# Patient Record
Sex: FEMALE | Race: WHITE | NOT HISPANIC OR LATINO | Employment: FULL TIME | URBAN - METROPOLITAN AREA
[De-identification: names, ages, dates, MRNs, and addresses within clinical notes are randomized per-mention and may not be internally consistent; named-entity substitution may affect disease eponyms.]

---

## 2017-05-01 ENCOUNTER — OFFICE VISIT (OUTPATIENT)
Dept: FAMILY MEDICINE | Facility: CLINIC | Age: 22
End: 2017-05-01
Payer: COMMERCIAL

## 2017-05-01 VITALS — DIASTOLIC BLOOD PRESSURE: 60 MMHG | TEMPERATURE: 98.6 F | SYSTOLIC BLOOD PRESSURE: 100 MMHG

## 2017-05-01 DIAGNOSIS — Z71.84 TRAVEL ADVICE ENCOUNTER: Primary | ICD-10-CM

## 2017-05-01 DIAGNOSIS — Z23 NEED FOR VACCINATION: ICD-10-CM

## 2017-05-01 PROCEDURE — 90472 IMMUNIZATION ADMIN EACH ADD: CPT | Mod: GA | Performed by: NURSE PRACTITIONER

## 2017-05-01 PROCEDURE — 90632 HEPA VACCINE ADULT IM: CPT | Mod: GA | Performed by: NURSE PRACTITIONER

## 2017-05-01 PROCEDURE — 99402 PREV MED CNSL INDIV APPRX 30: CPT | Mod: 25 | Performed by: NURSE PRACTITIONER

## 2017-05-01 PROCEDURE — 90715 TDAP VACCINE 7 YRS/> IM: CPT | Mod: GA | Performed by: NURSE PRACTITIONER

## 2017-05-01 PROCEDURE — 90691 TYPHOID VACCINE IM: CPT | Mod: GA | Performed by: NURSE PRACTITIONER

## 2017-05-01 PROCEDURE — 90471 IMMUNIZATION ADMIN: CPT | Mod: GA | Performed by: NURSE PRACTITIONER

## 2017-05-01 PROCEDURE — 90686 IIV4 VACC NO PRSV 0.5 ML IM: CPT | Mod: GA | Performed by: NURSE PRACTITIONER

## 2017-05-01 RX ORDER — AZITHROMYCIN 500 MG/1
500 TABLET, FILM COATED ORAL DAILY
Qty: 3 TABLET | Refills: 0 | Status: SHIPPED | OUTPATIENT
Start: 2017-05-01 | End: 2017-05-04

## 2017-05-01 RX ORDER — LEVONORGESTREL AND ETHINYL ESTRADIOL 0.15-0.03
1 KIT ORAL DAILY
Qty: 28 TABLET | Refills: 0 | COMMUNITY
Start: 2017-05-01

## 2017-05-01 NOTE — PATIENT INSTRUCTIONS
Today May 1, 2017 you received the    Flu Vaccine    Hepatitis A Vaccine - Please return on 10/28/17 or later for your 2nd and final dose.    Tetanus (Tdap) Vaccine    Typhoid - injectable. This vaccine is valid for two years.   .    These appointments can be made as a NURSE ONLY visit.    **It is very important for the vaccinations to be given on the scheduled day(s), this helps ensure you receive the full effectiveness of the vaccine.**    Please call [facility] with any questions [phone]    Thank you for visiting [facility]

## 2017-05-01 NOTE — PROGRESS NOTES
Nurse Note      Itinerary:  [facility]      Departure Date: 5/14/17      Return Date: 6/4/17      Length of Trip 3 weeks      Reason for Travel: Study abroad           Urban or rural: both      Accommodations: Hotel    Hostel        IMMUNIZATION HISTORY  Have you received any immunizations within the past 4 weeks?  No  Have you ever fainted from having your blood drawn or from an injection?  No  Have you ever had a fever reaction to vaccination?  No  Have you ever had any bad reaction or side effect from any vaccination?  No  Have you ever had hepatitis A or B vaccine?  Yes  Do you live (or work closely) with anyone who has AIDS, an AIDS-like condition, any other immune disorder or who is on chemotherapy for cancer?  No  Do you have a family history of immunodeficiency?  No  Have you received any injection of immune globulin or any blood products during the past 12 months?  No    Patient roomed by Paul Resendez  Tricia Saldivar is a 22 year old female seen today alone for counsultation for international travel to [facility] for Study abroad.  Patient will be departing in  2 day(s) and staying for   3 week(s) and  traveling with [facility].      Patient itinerary :  will be in the urban region [facility] which presents risk for Dengue Fever, Chikungungya, Rabies, food borne illnesses, motor vehicle accidents and Typhoid. exposure.      Patient's activities will include sightseeing, visiting hospital/orphanages and study abroad.  [facility] [employer]     Patient's country of birth is [place]    Special medical concerns: none  Pre-travel questionnaire was completed by patient and reviewed by provider.     Vitals: /60  Temp 98.6  F (37  C) (Oral)  BMI= There is no height or weight on file to calculate BMI.    EXAM:  General:  Well-nourished, well-developed in no acute distress.  Appears to be stated age, interacts appropriately and expresses understanding of information given to  patient.    Current Outpatient Prescriptions   Medication Sig Dispense Refill     levonorgestrel-ethinyl estradiol (NORDETTE) 0.15-30 MG-MCG per tablet Take 1 tablet by mouth daily 28 tablet 0     There is no problem list on file for this patient.    No Known Allergies      Immunizations discussed include:   Hepatitis A:  Ordered/given today, risks, benefits and side effects reviewed  Hepatitis B: Up to date  Influenza: Ordered/given today, risks, benefits and side effects reviewed  Typhoid: Ordered/given today, risks, benefits and side effects reviewed  Rabies: Declined  reviewed managment of a animal bite or scratch (washing wound, seek medical care within 24 hours for post exposure prophylaxis )  Yellow Fever: Not indicated  Japanese Encephalitis: Not indicated  Meningococcus: Up to date  Tetanus/Diphtheria: booster today  Measles/Mumps/Rubella: Up to date  Cholera: Not needed  Polio: Up to date  Pneumococcal: Under age of 65  Varicella: Up to date (had disease)   Zostavax:  Not indicated  HPV:  Up to date  TB:  Low risk     Altitude Exposure on this trip: no ([place]     ASSESSMENT/PLAN:    ICD-10-CM    1. Travel advice encounter Z71.89 HEPA VACCINE ADULT IM     TYPHOID VACCINE, IM     HC FLU VAC PRESRV FREE QUAD SPLIT VIR 3+YRS IM     TDAP VACCINE (ADACEL)     azithromycin (ZITHROMAX) 500 MG tablet   2. Need for vaccination Z23 HEPA VACCINE ADULT IM     TYPHOID VACCINE, IM     HC FLU VAC PRESRV FREE QUAD SPLIT VIR 3+YRS IM     TDAP VACCINE (ADACEL)     I have reviewed general recommendations for safe travel   including: food/water precautions, insect precautions, safer sex   practices given high prevalence of Zika, HIV and other STDs,   roadway safety. Educational materials and [employer] report provided.    Malaraia prophylaxis recommended: none  Symptomatic treatment for traveler's diarrhea: azithromycin  Altitude illness prevention and treatment: no      Evacuation insurance advised and resources were  provided to patient.    Total visit time 30 minutes  with over 50% of time spent counseling patient as detailed above.    Lauren Macedo CNP

## 2017-05-01 NOTE — NURSING NOTE
Chief Complaint   Patient presents with     Travel Clinic     initial /60  Temp 98.6  F (37  C) (Oral) There is no height or weight on file to calculate BMI.  BP completed using cuff size: regular.  L  arm      Health Maintenance that is potentially due pending provider review:  NONE    n/a    Paul Ruffin ma

## 2017-05-01 NOTE — MR AVS SNAPSHOT
After Visit Summary   5/1/2017    Tricia Saldivar    MRN: [id number]           Patient Information     Date Of Birth          1995        Visit Information        Provider Department      5/1/2017 9:15 AM aLuren Macedo APRN CNP [facility]        Todays Diagnoses     Travel advice encounter    -  1    Need for vaccination          Care Instructions    Today May 1, 2017 you received the    Flu Vaccine    Hepatitis A Vaccine - Please return on 10/28/17 or later for your 2nd and final dose.    Tetanus (Tdap) Vaccine    Typhoid - injectable. This vaccine is valid for two years.   .    These appointments can be made as a NURSE ONLY visit.    **It is very important for the vaccinations to be given on the scheduled day(s), this helps ensure you receive the full effectiveness of the vaccine.**    Please call [facility] with any questions [phone]    Thank you for visiting [facility]            Follow-ups after your visit        Who to contact     If you have questions or need follow up information about [facility]'s clinic visit or your schedule please contact [facility] directly at [phone].  Normal or non-critical lab and imaging results will be communicated to you by MyChart, letter or phone within 4 business days after the clinic has received the results. If you do not hear from us within 7 days, please contact the clinic through [employer] or phone. If you have a critical or abnormal lab result, we will notify you by phone as soon as possible.  Submit refill requests through "[employer] or call your pharmacy and they will forward the refill request to us. Please allow 3 business days for your refill to be completed.          Additional Information About Your Visit        MyChart Information     "[employer] lets you send messages to your doctor, view your test results, renew your prescriptions, schedule appointments and more. To sign up, go to  "[email or web address] . Click on \"Log in\" on the left side of the screen, which will take you to the Welcome page. Then click on \"Sign up Now\" on the right side of the page.     You will be asked to enter the access code listed below, as well as some personal information. Please follow the directions to create your username and password.     Your access code is: [email or web address]  Expires: 2017  9:42 AM     Your access code will  in 90 days. If you need help or a new code, please call your [place] clinic or [phone].        Care EveryWhere ID     This is your Care EveryWhere ID. This could be used by other organizations to access your [place] medical records  [id number]        Your Vitals Were     Temperature                   98.6  F (37  C) (Oral)            Blood Pressure from Last 3 Encounters:   17 100/60    Weight from Last 3 Encounters:   No data found for Wt              We Performed the Following     HC FLU VAC PRESRV FREE QUAD SPLIT VIR 3+YRS IM     HEPA VACCINE ADULT IM     TDAP VACCINE (ADACEL)     TYPHOID VACCINE, IM          Today's Medication Changes          These changes are accurate as of: 17  9:42 AM.  If you have any questions, ask your nurse or doctor.               Start taking these medicines.        Dose/Directions    azithromycin 500 MG tablet   Commonly known as:  ZITHROMAX   Used for:  Travel advice encounter   Started by:  Lauren Macedo APRN CNP        Dose:  500 mg   Take 1 tablet (500 mg) by mouth daily for 3 doses Take 1 tablet a day for up to 3 days for severe diarrhea   Quantity:  3 tablet   Refills:  0            Where to get your medicines      These medications were sent to [place] [id number] [place] [facility] [address] [facility]  [address] [facility] [address]     Phone:  [phone]     azithromycin 500 MG tablet                Primary Care Provider    None Specified       No primary provider on file.        Thank you!     Thank you for " choosing [facility]  for your care. Our goal is always to provide you with excellent care. Hearing back from our patients is one way we can continue to improve our services. Please take a few minutes to complete the written survey that you may receive in the mail after your visit with us. Thank you!             Your Updated Medication List - Protect others around you: Learn how to safely use, store and throw away your medicines at www.disposemymeds.org.          This list is accurate as of: 5/1/17  9:42 AM.  Always use your most recent med list.                   Brand Name Dispense Instructions for use    azithromycin 500 MG tablet    ZITHROMAX    3 tablet    Take 1 tablet (500 mg) by mouth daily for 3 doses Take 1 tablet a day for up to 3 days for severe diarrhea       levonorgestrel-ethinyl estradiol 0.15-30 MG-MCG per tablet    NORDETTE    28 tablet    Take 1 tablet by mouth daily

## 2024-06-05 ENCOUNTER — OFFICE VISIT (OUTPATIENT)
Dept: URGENT CARE | Facility: URGENT CARE | Age: 29
End: 2024-06-05
Payer: COMMERCIAL

## 2024-06-05 VITALS
TEMPERATURE: 97.9 F | SYSTOLIC BLOOD PRESSURE: 110 MMHG | HEART RATE: 83 BPM | WEIGHT: 133 LBS | DIASTOLIC BLOOD PRESSURE: 72 MMHG | OXYGEN SATURATION: 97 %

## 2024-06-05 DIAGNOSIS — M25.562 ARTHRALGIA OF BOTH KNEES: ICD-10-CM
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
## 2024-06-05 DIAGNOSIS — M25.561 ARTHRALGIA OF BOTH KNEES: ICD-10-CM

## 2024-06-05 DIAGNOSIS — S80.862A TICK BITE OF LEFT LOWER LEG, INITIAL ENCOUNTER: Primary | ICD-10-CM

## 2024-06-05 DIAGNOSIS — M79.10 MYALGIA: ICD-10-CM

## 2024-06-05 DIAGNOSIS — R50.9 FEVER IN ADULT: ICD-10-CM

## 2024-06-05 PROCEDURE — 99203 OFFICE O/P NEW LOW 30 MIN: CPT | Performed by: NURSE PRACTITIONER

## 2024-06-05 RX ORDER — DOXYCYCLINE 100 MG/1
100 CAPSULE ORAL 2 TIMES DAILY
Qty: 20 CAPSULE | Refills: 0 | Status: SHIPPED | OUTPATIENT
Start: 2024-06-05 | End: 2024-06-15

## 2024-06-05 ASSESSMENT — PAIN SCALES - GENERAL: PAINLEVEL: MILD PAIN (3)

## 2024-06-05 NOTE — PROGRESS NOTES
Chief Complaint   Patient presents with    Urgent Care     Pt went camping on 05/10  Symptoms started on 06/1    Tick Bite    Headache    Fever    Fatigue     SUBJECTIVE:  Tricia Saldivar is a 29 year old female presenting with concern for Lyme.  She went camping early May and found multiple wood and deer ticks, most concerning to the LLE.  In the past week she has noted headaches myalgias arthralgias fever fatigue.  No obvious URI cough cold UTI or other source of infection.    No past medical history on file.  Current Outpatient Medications   Medication Sig Dispense Refill    levonorgestrel-ethinyl estradiol (NORDETTE) 0.15-30 MG-MCG per tablet Take 1 tablet by mouth daily 28 tablet 0     No current facility-administered medications for this visit.     Social History     Tobacco Use    Smoking status: Never    Smokeless tobacco: Never   Substance Use Topics    Alcohol use: Not on file     No Known Allergies    Review of Systems  All systems negative except for those listed above in HPI.    OBJECTIVE:   /72   Pulse 83   Temp 97.9  F (36.6  C) (Temporal)   Wt 60.3 kg (133 lb)   SpO2 97%     Physical Exam  Vitals reviewed.   Constitutional:       General: She is not in acute distress.     Appearance: Normal appearance. She is not ill-appearing, toxic-appearing or diaphoretic.   HENT:      Head: Normocephalic and atraumatic.      Right Ear: Tympanic membrane and ear canal normal.      Left Ear: Tympanic membrane and ear canal normal.      Nose: Nose normal.      Mouth/Throat:      Mouth: Mucous membranes are moist.      Pharynx: Oropharynx is clear. No oropharyngeal exudate or posterior oropharyngeal erythema.   Cardiovascular:      Rate and Rhythm: Normal rate.      Pulses: Normal pulses.   Pulmonary:      Effort: Pulmonary effort is normal. No respiratory distress.      Breath sounds: No stridor. No wheezing.   Musculoskeletal:         General: Normal range of motion.      Cervical back: Normal range of  "motion.   Lymphadenopathy:      Cervical: No cervical adenopathy.   Skin:     General: Skin is warm and dry.      Findings: No rash.   Neurological:      General: No focal deficit present.      Mental Status: She is alert and oriented to person, place, and time.      Sensory: No sensory deficit.      Motor: No weakness.      Gait: Gait normal.   Psychiatric:         Mood and Affect: Mood normal.         Behavior: Behavior normal.       ASSESSMENT:    ICD-10-CM    1. Tick bite of left lower leg, initial encounter  S80.862A doxycycline hyclate (VIBRAMYCIN) 100 MG capsule    W57.XXXA       2. Fever in adult  R50.9 doxycycline hyclate (VIBRAMYCIN) 100 MG capsule      3. Arthralgia of both knees  M25.561     M25.562       4. Myalgia  M79.10 doxycycline hyclate (VIBRAMYCIN) 100 MG capsule          PLAN:     Shared decision making to treat empirically for Lyme  No other source of infection  Serology testing may not show for 6 to 8 weeks  Patient is not breast-feeding or pregnant  Understands to take medicine with food but not dairy for an hour of the pills  Reevaluation if new or worsening symptoms    A tick must be attached for at least 24-48 hours to transfer pathogens and cause an infection. Infection is very unlikely in the first 48-72 hours.  Infection is more likely if the tick is engorged or has been attached for over 72 hours.  Prophylactic antibiotics are recommended only if:  The tick was attached for 36 hours or more AND less than 72 hours has passed since the tick was removed  Treatment with a single dose of Doxycycline, 200mg (4mg/kg in children 8 years of age or older) by mouth with food.  -Do not give to children under 8 or pregnant or lactating women  If there is a contraindication to doxycycline, an alternate antibiotic is not recommended.  Guidelines state, \"If sections of the mouthparts of the tick remain in the skin, they should be left alone as they will normally be expelled spontaneously.\"  Ticks " should be removed with tweezers or protected fingers pulling straight out gently and firmly using steady pressure.   Apply ice packs, may take benadryl as needed itch/irritation.  Ibuprofen for discomfort.   Watch for signs of secondary infection- redness, swelling, pain, colored discharge or fever.  Advised to watch for erythema migrans rash (circular red ringed rash), fever, chills, sweats, body aches, stiff neck, headache, joint pain/ discomfort/ sweling or other flu/illness symptoms and be seen by primary care provider at that time.   Erythema migrans rash appears several days after tick bite and is separate from a local reaction to a tick bite.  Please follow up with primary care provider if not improving, worsening or new symptoms.  It will take 6-8 weeks before antibodies are detected with the lyme screen titer.      Follow up with primary care provider with any problems, questions or concerns or if symptoms worsen or fail to improve. Patient agreed to plan and verbalized understanding.    Susan Pierre, TANYA-BC  [facility]

## 2024-10-08 ENCOUNTER — OFFICE VISIT (OUTPATIENT)
Dept: URGENT CARE | Facility: URGENT CARE | Age: 29
End: 2024-10-08

## 2024-10-08 VITALS
OXYGEN SATURATION: 100 % | DIASTOLIC BLOOD PRESSURE: 82 MMHG | RESPIRATION RATE: 14 BRPM | TEMPERATURE: 99 F | SYSTOLIC BLOOD PRESSURE: 123 MMHG | HEART RATE: 78 BPM

## 2024-10-08 DIAGNOSIS — B02.9 HERPES ZOSTER WITHOUT COMPLICATION: Primary | ICD-10-CM

## 2024-10-08 PROCEDURE — 99213 OFFICE O/P EST LOW 20 MIN: CPT | Performed by: NURSE PRACTITIONER

## 2024-10-08 RX ORDER — VALACYCLOVIR HYDROCHLORIDE 1 G/1
1000 TABLET, FILM COATED ORAL 3 TIMES DAILY
Qty: 21 TABLET | Refills: 0 | Status: SHIPPED | OUTPATIENT
Start: 2024-10-08 | End: 2024-10-15

## 2024-10-08 NOTE — PATIENT INSTRUCTIONS
Valacyclovir 1000mg as directed, three times a day for 7 days.  This is from a reactivation of the chicken pox virus.  Keep covered to avoid spread especially around pregnant women and infants.  Avoid direct contact with others- can be spread through direct contact with fluid in blisters.  Avoid touching affected area; Wash hands frequently.  May apply calamine as needed for itch relief.  May take up to 4000 mg of Tylenol daily- 1000 mg 4 times daily.  May take up to 800 mg ibuprofen every 8 hours.  Alternate Tylenol and Ibuprofen every 4 hours.  Follow up with primary care provider as needed if symptoms fail to resolve or worsen.

## 2024-10-08 NOTE — PROGRESS NOTES
Chief Complaint   Patient presents with    Urgent Care    Neck Pain    Derm Problem     Patient presents with a stiff neck and a rash on the left side.      SUBJECTIVE:  Tricia Saldivar is a 29 year old female who presents to the clinic today with lymphadenopathy neck pain tension headache malaise itching tingling burning sensation to the left side of the neck over the last 3 days.  Noticed a vesicular erythematous dermatomal distributed rash to the left neck in the last day.  Has never had herpes.    No past medical history on file.  Current Outpatient Medications   Medication Sig Dispense Refill    levonorgestrel-ethinyl estradiol (NORDETTE) 0.15-30 MG-MCG per tablet Take 1 tablet by mouth daily 28 tablet 0    valACYclovir (VALTREX) 1000 mg tablet Take 1 tablet (1,000 mg) by mouth 3 times daily for 7 days. 21 tablet 0     No current facility-administered medications for this visit.     Social History     Tobacco Use    Smoking status: Never    Smokeless tobacco: Never   Substance Use Topics    Alcohol use: Not on file     No Known Allergies    Review of Systems  All systems negative except for those listed above in HPI.    EXAM:   /82 (BP Location: Right arm)   Pulse 78   Temp 99  F (37.2  C) (Oral)   Resp 14   SpO2 100%     Physical Exam  Vitals reviewed.   Constitutional:       General: She is not in acute distress.     Appearance: Normal appearance. She is not ill-appearing, toxic-appearing or diaphoretic.   HENT:      Head: Normocephalic and atraumatic.   Cardiovascular:      Rate and Rhythm: Normal rate.   Pulmonary:      Effort: Pulmonary effort is normal.   Musculoskeletal:         General: Normal range of motion.   Lymphadenopathy:      Cervical: Cervical adenopathy present.   Skin:     General: Skin is warm and dry.      Findings: Erythema and rash present.   Neurological:      General: No focal deficit present.      Mental Status: She is alert and oriented to person, place, and time.    Psychiatric:         Mood and Affect: Mood normal.         Behavior: Behavior normal.       ASSESSMENT:    ICD-10-CM    1. Herpes zoster without complication  B02.9 valACYclovir (VALTREX) 1000 mg tablet        PLAN:    Shingles  Differential includes herpes gladitorum type 1 or 2  Nothing weeping to culture yet today  Valacyclovir 1000mg as directed, three times a day for 7 days.  This is from a reactivation of the chicken pox virus.  Keep covered to avoid spread especially around pregnant women and infants.  Avoid direct contact with others- can be spread through direct contact with fluid in blisters.  Avoid touching affected area; Wash hands frequently.  May apply calamine as needed for itch relief.  May take up to 4000 mg of Tylenol daily- 1000 mg 4 times daily.  May take up to 800 mg ibuprofen every 8 hours.  Alternate Tylenol and Ibuprofen every 4 hours.  Follow up with primary care provider as needed if symptoms fail to resolve or worsen.    Follow up with primary care provider with any problems, questions or concerns or if symptoms worsen or fail to improve. Patient agreed to plan and verbalized understanding.    Susan Pierre, TANYA-BC  [facility]